# Patient Record
Sex: MALE | Race: WHITE
[De-identification: names, ages, dates, MRNs, and addresses within clinical notes are randomized per-mention and may not be internally consistent; named-entity substitution may affect disease eponyms.]

---

## 2018-05-05 ENCOUNTER — HOSPITAL ENCOUNTER (OUTPATIENT)
Dept: HOSPITAL 77 - KA.ED | Age: 53
Setting detail: OBSERVATION
LOS: 1 days | Discharge: HOME | End: 2018-05-06
Attending: NURSE PRACTITIONER | Admitting: PHYSICIAN ASSISTANT
Payer: COMMERCIAL

## 2018-05-05 DIAGNOSIS — F17.210: ICD-10-CM

## 2018-05-05 DIAGNOSIS — E83.42: ICD-10-CM

## 2018-05-05 DIAGNOSIS — E78.5: ICD-10-CM

## 2018-05-05 DIAGNOSIS — G47.00: ICD-10-CM

## 2018-05-05 DIAGNOSIS — L03.116: ICD-10-CM

## 2018-05-05 DIAGNOSIS — Z79.899: ICD-10-CM

## 2018-05-05 DIAGNOSIS — E86.0: ICD-10-CM

## 2018-05-05 DIAGNOSIS — E11.65: Primary | ICD-10-CM

## 2018-05-05 DIAGNOSIS — E43: ICD-10-CM

## 2018-05-05 DIAGNOSIS — D50.9: ICD-10-CM

## 2018-05-05 DIAGNOSIS — I10: ICD-10-CM

## 2018-05-05 DIAGNOSIS — K86.1: ICD-10-CM

## 2018-05-05 DIAGNOSIS — Z79.4: ICD-10-CM

## 2018-05-05 LAB
CHLORIDE SERPL-SCNC: 99 MMOL/L (ref 98–115)
SODIUM SERPL-SCNC: 133 MMOL/L (ref 136–145)

## 2018-05-05 PROCEDURE — 82962 GLUCOSE BLOOD TEST: CPT

## 2018-05-05 PROCEDURE — 96366 THER/PROPH/DIAG IV INF ADDON: CPT

## 2018-05-05 PROCEDURE — 87324 CLOSTRIDIUM AG IA: CPT

## 2018-05-05 PROCEDURE — 83036 HEMOGLOBIN GLYCOSYLATED A1C: CPT

## 2018-05-05 PROCEDURE — 85025 COMPLETE CBC W/AUTO DIFF WBC: CPT

## 2018-05-05 PROCEDURE — 80048 BASIC METABOLIC PNL TOTAL CA: CPT

## 2018-05-05 PROCEDURE — 83735 ASSAY OF MAGNESIUM: CPT

## 2018-05-05 PROCEDURE — 36415 COLL VENOUS BLD VENIPUNCTURE: CPT

## 2018-05-05 PROCEDURE — 99284 EMERGENCY DEPT VISIT MOD MDM: CPT

## 2018-05-05 PROCEDURE — 96375 TX/PRO/DX INJ NEW DRUG ADDON: CPT

## 2018-05-05 PROCEDURE — 80053 COMPREHEN METABOLIC PANEL: CPT

## 2018-05-05 PROCEDURE — G0378 HOSPITAL OBSERVATION PER HR: HCPCS

## 2018-05-05 PROCEDURE — 81001 URINALYSIS AUTO W/SCOPE: CPT

## 2018-05-05 PROCEDURE — 96365 THER/PROPH/DIAG IV INF INIT: CPT

## 2018-05-05 PROCEDURE — 96372 THER/PROPH/DIAG INJ SC/IM: CPT

## 2018-05-05 PROCEDURE — 83605 ASSAY OF LACTIC ACID: CPT

## 2018-05-05 PROCEDURE — 86140 C-REACTIVE PROTEIN: CPT

## 2018-05-05 PROCEDURE — 96361 HYDRATE IV INFUSION ADD-ON: CPT

## 2018-05-05 RX ADMIN — SODIUM CHLORIDE SCH GM: 0.9 INJECTION, SOLUTION INTRAVENOUS at 21:24

## 2018-05-05 RX ADMIN — SODIUM CHLORIDE SCH GM: 0.9 INJECTION, SOLUTION INTRAVENOUS at 17:11

## 2018-05-05 NOTE — PCM.HP
H&P History of Present Illness





- General


Date of Service: 05/05/18


Admit Problem/Dx: 


 Admission Diagnosis/Problem





Admission Diagnosis/Problem      Hyperglycemia








Source of Information: Patient, Old Records, Provider, RN


History Limitations: Reports: No Limitations


  ** Left Lower Leg


Pain Score (Numeric/FACES): 7





- Related Data


Allergies/Adverse Reactions: 


 Allergies











Allergy/AdvReac Type Severity Reaction Status Date / Time


 


No Known Drug Allergies Allergy  Cannot Verified 05/05/18 11:07





   Remember  











Home Medications: 


 Home Meds





Celecoxib 200 mg PO DAILY 05/05/18 [History]


Furosemide 20 mg PO DAILY 05/05/18 [History]


Lisinopril 20 mg PO DAILY 05/05/18 [History]


Vancomycin HCl 125 mg PO QID 05/05/18 [History]


oxyCODONE 10 mg PO ASDIRECTED 05/05/18 [History]


traZODone HCl [Trazodone HCl] 50 mg PO QAM 05/05/18 [History]











Past Medical History


HEENT History: Reports: Impaired Vision


Cardiovascular History: Reports: Hypertension


Gastrointestinal History: Reports: Chronic Diarrhea


Endocrine/Metabolic History: Reports: Diabetes, Type I





- Infectious Disease History


Infectious Disease History: Reports: C-Difficile, Chicken Pox





- Past Surgical History


HEENT Surgical History: Reports: Tonsillectomy


Cardiovascular Surgical History: Reports: None


GI Surgical History: Reports: Colonoscopy


Endocrine Surgical History: Reports: None


Musculoskeletal Surgical History: Reports: Arthroscopic Knee





Social & Family History





- Family History


Cardiac: Reports: Hypertension (Father)


Neurological: Reports: CVA (Maternal Grandmother)


Endocrine/Metabolic: Reports: Diabetes, type II (Brother)


Oncologic: Reports: Lung, Other (See Below)


Other Oncologic Family History: mother





- Tobacco Use


Smoking Status *Q: Current Every Day Smoker


Years of Tobacco use: 40


Packs/Tins Daily: 0.5


Used Tobacco, but Quit: No


Second Hand Smoke Exposure: Yes





- Caffeine Use


Caffeine Use: Reports: Soda


Other Caffeine Use: diet





- Recreational Drug Use


Recreational Drug Use: No





- Living Situation & Occupation


Living situation: Reports: , Other (Lives with son)


Occupation: Employed





H&P Review of Systems





- Review of Systems:


Review Of Systems: See Below


General: Reports: Chills (always cold).  Denies: Fever


HEENT: Reports: Glasses.  Denies: Ear Pain, Headaches, Sinus Congestion, Sore 

Throat


Pulmonary: Reports: Cough (occasional).  Denies: Shortness of Breath


Cardiovascular: Denies: Chest Pain, Edema, Lightheadedness


Gastrointestinal: Reports: Diarrhea (15 liquid stools with no stool consistency 

in last 12 hours).  Denies: Abdominal Pain, Constipation, Decreased Appetite, 

Nausea, Vomiting


Genitourinary: Reports: No Symptoms


Musculoskeletal: Reports: Leg Pain (Left lower leg pain)


Skin: Reports: Erythema (Improved).  Denies: Wound


Psychiatric: Denies: Agitation, Cravings


Neurological: Denies: Dizziness, Headache





Exam





- Exam


Exam: See Below





- Vital Signs


Vital Signs: 


 Last Vital Signs











Temp  98.6 F   05/05/18 12:30


 


Pulse  57 L  05/05/18 12:30


 


Resp  18   05/05/18 12:30


 


BP  165/94 H  05/05/18 12:30


 


Pulse Ox  100   05/05/18 12:30











Weight: 107 lb 3.2 oz





- Exam


Quality Assessment: DVT Prophylaxis (Score of 4-lovenox).  No: Supplemental 

Oxygen, Urinary Catheter


General: Alert, Oriented, Cooperative, Other (No distress)


HEENT: Conjunctiva Clear, Hearing Intact, Mucosa Moist & Pink, Posterior 

Pharynx Clear, TMs Clear, Glasses


Neck: Supple, Trachea Midline


Lungs: Clear to Auscultation, Normal Respiratory Effort


Cardiovascular: Regular Rate, Regular Rhythm, Normal S1, Normal S2


GI/Abdominal Exam: Soft, Non-Tender, No Distention, No Mass, Abnormal Bowel 

Sounds (hypoactive)


Extremities: No Pedal Edema, Leg Pain (Left lower leg pain with palpation of 

calf and shin, active ROM), Redness (left lower extremity sofía red in color, 

no open wounds or drainage).  No: Increased Warmth


Skin: Warm, Dry, Intact


Neurological: Normal Speech


Neuro Extensive - Mental Status: Alert, Oriented x3, Normal Mood/Affect


Psychiatric: Alert, Normal Affect, Normal Mood.  No: Anxious, Agitated





- Patient Data


Lab Results Last 24 hrs: 


 Laboratory Results - last 24 hr











  05/05/18 05/05/18 05/05/18 Range/Units





  11:35 11:35 11:35 


 


WBC  3.6 L    (5.0-10.0)  10^3/uL


 


RBC  3.53 L    (4.50-6.00)  10^6/uL


 


Hgb  11.7 L    (13.0-17.0)  g/dL


 


Hct  35.9 L    (40.0-52.0)  %


 


MCV  101.5 H    (82.0-92.0)  fL


 


MCH  33.0 H    (27.0-31.0)  pg


 


MCHC  32.5    (32.0-36.0)  g/dL


 


RDW  14.4    (11.5-14.5)  %


 


Plt Count  464 H    (150-300)  10^3/uL


 


MPV  6.5 L    (7.4-10.4)  fL


 


Neut % (Auto)  39.6 L    (50.0-70.0)  %


 


Lymph % (Auto)  42.6 H    (20.0-40.0)  %


 


Mono % (Auto)  12.9 H    (2.0-8.0)  %


 


Eos % (Auto)  3.6 H    (1.0-3.0)  %


 


Baso % (Auto)  1.3 H    (0.0-1.0)  %


 


Neut # (Auto)  1.4 L    (2.5-7.0)  10^3/uL


 


Lymph # (Auto)  1.6    (1.0-4.0)  10^3/uL


 


Mono # (Auto)  0.5    (0.1-0.8)  10^3/uL


 


Eos # (Auto)  0.1    (0.1-0.3)  10^3/uL


 


Baso # (Auto)  0.0    (0.0-0.1)  10^3/uL


 


Sodium   133 L   (136-145)  mmol/L


 


Potassium   5.2   (3.3-5.3)  mmol/L


 


Chloride   99   ()  mmol/L


 


Carbon Dioxide   19.2 L   (21.0-32.0)  mmol/L


 


BUN   12   (6-25)  mg/dL


 


Creatinine   0.53   (0.51-1.17)  mg/dL


 


Est Cr Clr Drug Dosing   102.22   mL/min


 


Estimated GFR (MDRD)   > 60   mL/min


 


Glucose   439 H   ()  mg/dL


 


POC Glucose     ()  mg/dl


 


Calcium   8.4 L   (8.7-10.3)  mg/dL


 


Magnesium    1.5 L  (1.8-2.4)  mg/dL


 


Total Bilirubin   0.3   (0.2-1.0)  mg/dL


 


AST   119 H   (15-37)  U/L


 


ALT   82 H   (12-78)  U/L


 


Alkaline Phosphatase   115   ()  IU/L


 


C-Reactive Protein     (0.0-0.9)  mg/dL


 


Total Protein   7.9   (6.4-8.2)  g/dL


 


Albumin   3.12   (3.00-4.80)  g/dL














  05/05/18 05/05/18 Range/Units





  11:35 13:15 


 


WBC    (5.0-10.0)  10^3/uL


 


RBC    (4.50-6.00)  10^6/uL


 


Hgb    (13.0-17.0)  g/dL


 


Hct    (40.0-52.0)  %


 


MCV    (82.0-92.0)  fL


 


MCH    (27.0-31.0)  pg


 


MCHC    (32.0-36.0)  g/dL


 


RDW    (11.5-14.5)  %


 


Plt Count    (150-300)  10^3/uL


 


MPV    (7.4-10.4)  fL


 


Neut % (Auto)    (50.0-70.0)  %


 


Lymph % (Auto)    (20.0-40.0)  %


 


Mono % (Auto)    (2.0-8.0)  %


 


Eos % (Auto)    (1.0-3.0)  %


 


Baso % (Auto)    (0.0-1.0)  %


 


Neut # (Auto)    (2.5-7.0)  10^3/uL


 


Lymph # (Auto)    (1.0-4.0)  10^3/uL


 


Mono # (Auto)    (0.1-0.8)  10^3/uL


 


Eos # (Auto)    (0.1-0.3)  10^3/uL


 


Baso # (Auto)    (0.0-0.1)  10^3/uL


 


Sodium    (136-145)  mmol/L


 


Potassium    (3.3-5.3)  mmol/L


 


Chloride    ()  mmol/L


 


Carbon Dioxide    (21.0-32.0)  mmol/L


 


BUN    (6-25)  mg/dL


 


Creatinine    (0.51-1.17)  mg/dL


 


Est Cr Clr Drug Dosing    mL/min


 


Estimated GFR (MDRD)    mL/min


 


Glucose    ()  mg/dL


 


POC Glucose   373 H  ()  mg/dl


 


Calcium    (8.7-10.3)  mg/dL


 


Magnesium    (1.8-2.4)  mg/dL


 


Total Bilirubin    (0.2-1.0)  mg/dL


 


AST    (15-37)  U/L


 


ALT    (12-78)  U/L


 


Alkaline Phosphatase    ()  IU/L


 


C-Reactive Protein  < 0.2   (0.0-0.9)  mg/dL


 


Total Protein    (6.4-8.2)  g/dL


 


Albumin    (3.00-4.80)  g/dL











Result Diagrams: 


 05/05/18 11:35





 05/05/18 11:35


Problem List Initiated/Reviewed/Updated: Yes


Orders Last 24hrs: 


 Active Orders 24 hr











 Category Date Time Status


 


 Patient Status [ADT] Routine ADT  05/05/18 12:24 Ordered


 


 Accu Check [Blood Glucose Check, Bedside] [RC] Care  05/05/18 13:47 Active





 QIDACANDBED   


 


 Intake and Output Strict [RC] ASDIRECTED Care  05/05/18 13:45 Active


 


 Oxygen Therapy [RC] PRN Care  05/05/18 12:24 Active


 


 Up ad Rema [RC] ASDIRECTED Care  05/05/18 13:45 Active


 


 VTE/DVT Education [RC] PER UNIT ROUTINE Care  05/05/18 12:24 Active


 


 Vital Signs [RC] 0300,0700,1100,1500,1900,2300 Care  05/05/18 12:24 Active


 


 CDIFF TOX A+B [OP] Routine Lab  05/05/18 13:14 Ordered


 


 LACTIC ACID [CHEM] Routine Lab  05/05/18 13:40 Received


 


 URINALYSIS W/MICROSCOPIC [UA W/MICROSCOPIC] [URIN] Stat Lab  05/05/18 12:26 

Ordered


 


 Acetaminophen [Tylenol Extra Strength] Med  05/05/18 13:44 Active





 1,000 mg PO Q6H PRN   


 


 B.Bif/B.Long/L.Acidoph/L.Rhamn [Multi-Chinyere Plus] Med  05/05/18 14:00 Ordered





 1 cap PO DAILY   


 


 Celecoxib [CeleBREX] Med  05/06/18 09:00 Active





 200 mg PO DAILY   


 


 Ibuprofen [Motrin] Med  05/05/18 13:56 Ordered





 800 mg PO Q8H PRN   


 


 Insulin Aspart [NovoLOG] Med  05/05/18 18:00 Active





 See Protocol  SUBCUT WITHMEALSANDBED   


 


 Insulin Detemir [Levemir] Med  05/06/18 09:00 Ordered





 32 unit SUBCUT DAILY   


 


 Lisinopril [Prinivil] Med  05/06/18 09:00 Active





 20 mg PO DAILY   


 


 Magnesium Oxide Med  05/05/18 14:00 Ordered





 500 mg PO BID   


 


 Piperacillin/Tazobactam [Zosyn] 4.5 gm Med  05/05/18 14:00 Ordered





 Sodium Chloride 0.9% [Normal Saline] 100 ml   





 IV Q8H   


 


 Sodium Chloride 0.9% @ 100 MLS/HR(1,000ml) Med  05/05/18 14:15 Ordered





 Sodium Chloride 0.9% [Normal Saline] 1,000 ml   





 IV ASDIRECTED   


 


 Vancomycin HCl [Vancomycin HCl] Med  05/05/18 17:00 Ordered





 125 mg PO QID   


 


 oxyCODONE [oxyCODONE] Med  05/05/18 13:55 Ordered





 10 mg PO QID PRN   


 


 traZODone Med  05/05/18 21:00 Ordered





 50 mg PO BEDTIME   


 


 Code Status [Resuscitation Status] Routine Resus Stat  05/05/18 13:46 Ordered








 Medication Orders





Acetaminophen (Tylenol Extra Strength)  1,000 mg PO Q6H PRN


   PRN Reason: Pain


Celecoxib (Celebrex)  200 mg PO DAILY ALINA


Piperacillin Sod/Tazobactam (Sod 4.5 gm/ Sodium Chloride)  100 mls @ 200 mls/hr 

IV Q8H ALINA


Ibuprofen (Motrin)  800 mg PO Q8H PRN


   PRN Reason: Pain


Insulin Aspart (Novolog)  0 unit SUBCUT WITHMEALSANDBED [facility] hospital; Protocol


Insulin Detemir (Levemir)  32 unit SUBCUT DAILY [facility] hospital


Lactobacillus Acidophilus/Rhamnosus (Multi-Chinyere Plus)  1 cap PO DAILY Critical [facility] hospital


Lisinopril (Prinivil)  20 mg PO DAILY ALINA


Magnesium Oxide (Magnesium Oxide)  500 mg PO BID Critical access hospital


Non-Formulary Medication (Vancomycin Hcl [Vancomycin Hcl])  125 mg PO QID [facility] hospital


Non-Formulary Medication (Oxycodone [Oxycodone])  10 mg PO QID PRN


   PRN Reason: severe pain


Trazodone HCl (Trazodone)  50 mg PO BEDTIME Critical [facility] hospital








Assessment/Plan Comment:: 





HPI: This is a 52 year old male who presented to the ED with concerns of 

diarrhea and left lower leg pain. Upon review of the [facility] chart, patient was 

hospitalized from 4/5/18-4/17/18 and 4/27/18-4/30/18 for left lower leg 

cellulitis with the first admission noting sepsis. Patient had MRI of the left 

lower extremity done on 4/5/18 that showed a combination of fasciitis, 

cellulitis, and myositis, but no evidence of osteomyelitis. On 4/28/18 patient 

had ultrasound of the extremity which showed persistent intrafascial collection 

similar to previous examinations. Patient notes the swelling and redness has 

improved, however the pain has not. He has taken tylenol at home without 

relief. The patient has a PICC line in place and is having Zosyn 4.5 gm every 8 

hours IV for a total of 28 days for treatment. In regards to the diarrhea, 

patient was noted to have a positive Clostridium difficile infection on 4/30/ 18. He has been taking oral vancomyin QID at home. 





Pertinent ED workup: 


-Labs: WBC 3.6, Sodium 133, Potassium 5.2, Creatinine 0.53, Glucose 439, 

, ALT 82; UA order-uncollected at this time


-IV dilaudid 0.5 mg x 1 - patient reports no relief, pain 10/10


-2 NS 1 liter boluses given


________________________________________________________________________________

______________________________________________________





Further workup: 


-CRP <0.2, Mg 1.5, Lactic acid 3.2


-Repeat Cdiff sample ordered





Primary Assessment/Plan: 





Hyperglycemia in type 2 versus type 1 diabetic. Epic chart notes type 1, 

however patient states he is a type 2. Accuchecks QID. Glucose down to 373. 

Continue lantus 32 units daily. Add novolog low dose sliding scale. ADA high 

protein diet. Obtain urine sample. 





Clostridium difficile infection. Repeat Cdiff when able. Continue vancomycin, 

however this will have to be changed to IV as no oral in stock and patient did 

not bring his with. NS at 100 mL/hr. Repeat BMP in AM. Probiotic daily. 





Left lower leg cellulitis, improving. Lactic acid 3.6--up slightly from 4/28/18 

at 2.0, CRP <0.2. Pictures reviewed in Epic chart from last hospitalization and 

there is great improvement noted today. Continue with zosyn 4.5 gm IV every 8 

hours per ID recommendation. Patient noting significant pain and no relief from 

dilaudid in ER. Review of Epic note, notes that he was given tylenol and 

ibuprofen while hospitalized and providers should be careful in prescribing 

opioids. 





Drug seeking behavior. Nurse called myself into patient room as he was refusing 

tylenol, ibuprofen, and oxycodone as ordered. Reviewed with patient that he 

must try these medications for pain as he is not able to be discharged with an 

IV pain medication. Patient resistant, but eventually agreeable to trying 

oxycodone 10 mg po. [place] PDMP reviewed and shows patient did last receive 

oxycodone 10 mg per his PCP on 4/24/18, however he states he has never taken 

any.





Hypomagnesemia. Mg 1.5. Magnesium oxide 500 mg po BID. Repeat level in AM. 





Secondary Assessment/Plan: 





Hypertension. Continue lisinopril. 





Hyperlipidemia. Not on statin. 





Tobacco abuse. Patient smokes 1/2 ppd. Declines nicotine patch at this time. 





Chronic pancreatitis. 





History of alcohol-induced pancreatitis. Reports minimal alcohol intake. Will 

observe for signs of DTs. 





Elevated LFTs.  and ALT 82, however these values are improved from Jan. 2018. 





Severe protein-calorie malnutrition. High protein diet ordered. 





Insomnia. Continue trazodone, however take at bedtime instead of the AM. 





Iron deficiency anemia. Hgb 11.7. Previous workup shows iron 36, TIBC 174, and 

ferritin 508. Patient not on supplementation. Will continue to monitor as this 

is improved from discharge hemoglobin of 9.3 on 4/30/18. 





DVT prophylaxis. Score of 4. Lovenox 30 mg subQ daily given weight. 





Overall treatment plan: Re-hydrate patient with IV fluids and continue to 

monitor blood sugars. Patient may be able to be discharged this evening or most 

definitely in the AM.

## 2018-05-05 NOTE — EDM.PDOC
ED HPI GENERAL MEDICAL PROBLEM





- General


Chief Complaint: Lower Extremity Injury/Pain


Stated Complaint: LEFT LEG SWELLING


Time Seen by Provider: 05/05/18 11:22


Source of Information: Reports: Patient


History Limitations: Reports: No Limitations





- History of Present Illness


INITIAL COMMENTS - FREE TEXT/NARRATIVE: 





Patient is a 52-year-old gentleman who presents to the emergency department 

this morning with a complaint of left lower extremity pain and chronic 

diarrhea.  Patient states that he initially was diagnosed with cellulitis of 

left lower extremity 1 month ago and was started on antibiotics.  Patient was 

found to have C. difficile on April 30.  He was then started on vancomycin.  

Patient states that he chronic discomfort of left lower extremity and diarrhea 

is persistent, and he feels extremely weak.  He is a [facility] Patient.  

Patient denies fever, chest pain, shortness of breath,, stiff neck, out of 

country travel, or any change in medication other than recent vancomycin 

initiation.


Onset: Gradual


Duration: Chronic


Location: Reports: Abdomen, Lower Extremity, Left


Quality: Reports: Ache, Burning


Severity: Mild


Improves with: Reports: None


Worsens with: Reports: Movement


Associated Symptoms: Reports: No Other Symptoms


Treatments PTA: Reports: Other (see below) (Patient is on vancomycin daily.  

Patient is also has PICC line for another unknown antibiotic.)


  ** Left Lower Leg


Pain Score (Numeric/FACES): 10





- Related Data


 Allergies











Allergy/AdvReac Type Severity Reaction Status Date / Time


 


No Known Drug Allergies Allergy  Cannot Verified 05/05/18 11:07





   Remember  











Home Meds: 


 Home Meds





Celecoxib 200 mg PO DAILY 05/05/18 [History]


Furosemide 20 mg PO DAILY 05/05/18 [History]


Lisinopril 20 mg PO DAILY 05/05/18 [History]


Vancomycin HCl 125 mg PO QID 05/05/18 [History]


oxyCODONE 10 mg PO ASDIRECTED 05/05/18 [History]


traZODone HCl [Trazodone HCl] 50 mg PO QAM 05/05/18 [History]











Review of Systems





- Review of Systems


Review Of Systems: ROS reveals no pertinent complaints other than HPI.


Constitutional: Reports: Weakness


Eyes: Reports: No Symptoms


Ears: Reports: No Symptoms


Nose: Reports: No Symptoms


Mouth/Throat: Reports: No Symptoms


Respiratory: Reports: No Symptoms


Cardiovascular: Reports: No Symptoms


GI/Abdominal: Reports: Diarrhea.  Denies: Nausea, Vomiting


Genitourinary: Reports: No Symptoms


Musculoskeletal: Reports: Leg Pain (Left lower)


Skin: Reports: Erythema (Persistent secondary to cellulitis of left lower 

extremity from knee distal to ankle.)


Neurological: Reports: No Symptoms


Psychiatric: Reports: No Symptoms





ED EXAM, GENERAL





- Physical Exam


Exam: See Below


Exam Limited By: No Limitations


General Appearance: Alert, WD/WN, No Apparent Distress


Nose: Normal Inspection, Normal Mucosa, No Blood


Throat/Mouth: Normal Inspection, Normal Oropharynx, No Airway Compromise


Head: Atraumatic, Normocephalic


Neck: Normal Inspection


Respiratory/Chest: No Respiratory Distress, Lungs Clear, Normal Breath Sounds, 

No Accessory Muscle Use, Chest Non-Tender


Cardiovascular: Regular Rate, Rhythm


GI/Abdominal: Normal Bowel Sounds, Soft, Tender (Mildly tender left lower 

quadrant.  Patient states that this is not a new discomfort.)


Back Exam: Normal Inspection.  No: CVA Tenderness (L), CVA Tenderness (R)


Extremities: Leg Pain, Other (Left lower extremity from just below knee distal 

to ankle and medial aspect color changes with resolving cellulitis.)


Neurological: Alert, Oriented, Normal Cognition


Psychiatric: Normal Affect, Normal Mood


Skin Exam: Warm, Dry, Intact.  No: Ecchymosis, Increased Warmth, Zoster-Like 

Rash


Lymphatic: No Adenopathy





Course





- Vital Signs


Last Recorded V/S: 





 Last Vital Signs











Temp  98.6 F   05/05/18 11:04


 


Pulse  66   05/05/18 11:04


 


Resp  18   05/05/18 11:04


 


BP  160/89 H  05/05/18 11:04


 


Pulse Ox  100   05/05/18 11:04














- Orders/Labs/Meds


Orders: 





 Active Orders 24 hr











 Category Date Time Status


 


 CBC WITH AUTO DIFF [HEME] Stat Lab  05/05/18 11:22 Ordered


 


 COMPREHENSIVE METABOLIC PN,CMP [CHEM] Stat Lab  05/05/18 11:22 Ordered


 


 HYDROmorphone [Dilaudid] Med  05/05/18 11:23 Once





 0.5 mg IVPUSH ONETIME ONE   


 


 Sodium Chloride 0.9% [Normal Saline] 1,000 ml Med  05/05/18 11:22 Ordered





 IV .BOLUS   








 Medication Orders





Hydromorphone HCl (Dilaudid)  0.5 mg IVPUSH ONETIME ONE


   Stop: 05/05/18 11:24


Sodium Chloride (Normal Saline)  1,000 mls @ 1,000 mls/hr IV .BOLUS STA


   Stop: 05/05/18 12:21








Meds: 





Medications











Generic Name Dose Route Start Last Admin





  Trade Name Jemma  PRN Reason Stop Dose Admin


 


Hydromorphone HCl  0.5 mg  05/05/18 11:23  





  Dilaudid  IVPUSH  05/05/18 11:24  





  ONETIME ONE   





     





     





     





     


 


Sodium Chloride  1,000 mls @ 1,000 mls/hr  05/05/18 11:22  





  Normal Saline  IV  05/05/18 12:21  





  .BOLUS STA   





     





     





     





     














- Re-Assessments/Exams


Free Text/Narrative Re-Assessment/Exam: 





05/05/18 12:27


Patient afebrile, nontoxic appearing, vital signs stable.  Pain controlled.  

Discussed case with Monica Jesus, nurse practitioner, and patient will be 

admitted for observation to [facility] and followed





Departure





- Departure


Time of Disposition: 12:28


Disposition: Refer to Observation


Condition: Fair


Clinical Impression: 


 Hyperglycemia, Dehydration, C. difficile diarrhea








- Discharge Information


Referrals: 


Geronimo Muñoz PA-C [Primary Care Provider] - 





- My Orders


Last 24 Hours: 





My Active Orders





05/05/18 11:22


CBC WITH AUTO DIFF [HEME] Stat 


COMPREHENSIVE METABOLIC PN,CMP [CHEM] Stat 


Sodium Chloride 0.9% [Normal Saline] 1,000 ml IV .BOLUS 





05/05/18 11:23


HYDROmorphone [Dilaudid]   0.5 mg IVPUSH ONETIME ONE 














- Assessment/Plan


Last 24 Hours: 





My Active Orders





05/05/18 11:22


CBC WITH AUTO DIFF [HEME] Stat 


COMPREHENSIVE METABOLIC PN,CMP [CHEM] Stat 


Sodium Chloride 0.9% [Normal Saline] 1,000 ml IV .BOLUS 





05/05/18 11:23


HYDROmorphone [Dilaudid]   0.5 mg IVPUSH ONETIME ONE 











Assessment:: 





Hyperglycemia, dehydration, C. difficile


Plan: 





Admit observation for [place]

## 2018-05-06 LAB
CHLORIDE SERPL-SCNC: 104 MMOL/L (ref 98–115)
SODIUM SERPL-SCNC: 135 MMOL/L (ref 136–145)

## 2018-05-06 RX ADMIN — SODIUM CHLORIDE SCH GM: 0.9 INJECTION, SOLUTION INTRAVENOUS at 08:14

## 2018-05-06 NOTE — [id number]
**Discharge Summary





- Hospital Course


Brief History: This is a 52 year old male who presented to the ED with concerns 

of diarrhea and left lower leg pain. Upon review of the [facility] chart, patient was 

hospitalized from 4/5/18-4/27/18 and 4/27/18-4/30/18 for left lower leg 

cellulitis with the first admission noting sepsis. Patient had MRI of left 

extremity done on 4/5/18 that showed a combination of fasciitis, cellulitis, 

and myositis without evidence of osteomyelitis. The patient has a PICC line in 

place and is receiving Zosyn 4.5 gm IV every 8 hours for a total of 28 days for 

treatment of this. The patient had tried tylenol at home without relief in 

pain. The patient was found to have a positive Cdiff infection on 4/30/18 and 

had been on oral vancomycin at home. He reported 15 loose stools in the 

previous 12 hours prior to admission. He was found to have hyperglycemia and 

clinical dehdyration in the ED and was subsequently admitted for IV fluids and 

glucose monitoring.





- Discharge Data


Discharge Date: 05/06/18


Discharge Disposition: Home, Self-Care 01


Condition: Good





- Patient Instructions


Diet: Diabetic Diet


Activity: As Tolerated


Driving: May Drive Today


Showering/Bathing: May Shower


Notify Provider of: Fever, Increased Pain, Swelling and Redness, Drainage


Other/Special Instructions: Continue to monitor your blood sugars four times a 

day and use the novolog sliding scale I have given you to correct high 

readings.  Finish 7 days total of Vancomycin as your repeat stool sample was 

negative.  Tylenol 1000 mg every 6 hours as needed for pain AND/OR Ibuprofen 

800 mg three times a day as needed for pain.  Continue to take a probiotic once 

a day as before.





- Discharge Plan


Prescriptions/Med Rec: 


Magnesium Oxide 500 mg PO TID #90 tablet


traZODone HCl [Trazodone HCl] 100 mg PO BEDTIME #60 tablet


Home Medications: 


 Home Meds





Celecoxib 200 mg PO DAILY 05/05/18 [History]


Furosemide 20 mg PO DAILY 05/05/18 [History]


Lisinopril 20 mg PO DAILY 05/05/18 [History]


Acetaminophen [Tylenol Extra Strength] 1,000 mg PO Q6H PRN  tablet 05/06/18 [Rx]


Insulin Aspart [NovoLOG] See Protocol SUBCUT WITHMEALSANDBED  pen 05/06/18 [Rx]


Insulin Detemir [Levemir] 32 unit SUBCUT DAILY  pen 05/06/18 [Rx]


Magnesium Oxide 500 mg PO TID #90 tablet 05/06/18 [Rx]


Piperacillin/Tazobactam [Zosyn] 4.5 gm IV Q8H  vial 05/06/18 [Rx]


Vancomycin HCl 125 mg PO QID #0 05/06/18 [Rx]


traZODone HCl [Trazodone HCl] 100 mg PO BEDTIME #60 tablet 05/06/18 [Rx]








Referrals: 


Geronimo Muñoz PA-C [Primary Care Provider] - 05/09/18





- Discharge Summary/Plan Comment


DC Time >30 min.: No


Discharge Summary/Plan Comment: 





Date of admission 5/5/18





Date of discharge: 5/6/18





Admitting diagnosis: 





   Primary: Hyperglycemia in type 2 diabetic on insulin, Cdiff infection with 

clinical dehydration, Left lower leg cellulitis with pain, Hypomagnesemia, Drug-

seeking behavior


   Secondary: Hypertension, Hyperlipidemia, Tobacco abuse, Chronic pancreatitis

, History of alcohol-induced pancreatitis, Elevated LFTs, Severe protein-

calorie malnutrition, Insomnia, Iron deficiency anemia





Final diagnosis: 





   Primary; Hyperglycemia in type 2 diabetic on insulin, improving; Cdiff 

infection, resolved; Clinical dehydration, resolved; Left lower leg cellulitis, 

resolving; Left lower leg pain, unchanged; Hypomagnesemia, stable; Drug-seeking 

behavior


   Secondary; Hypertension, Hyperlipidemia, Tobacco abuse, Chronic pancreatitis

, History of alcohol-induced pancreatitis, Elevated LFTs, Severe protein-

calorie malnutrition, Insomnia, Iron deficiency anemia





Procedures performed: None





Hospital Course:





The patient had an uneventful hospital course that went as expected. The 

patient remained afebrile and hemodynamically stable throughout his stay. He 

was given 2 one liter boluses of normal saline and then continued on NS at 100 

mL/hr. He was able to drink and eat without incident. The patient's blood 

sugars were monitored QID and corrected with insulin. Glucoses after admission 

include 373, 433, 485, and 275. A1c 9.7. He was given his home lantus dose as 

well. UA negative for ketones. The patient was found to have hypomagnesemia and 

was given oral replacement of this. The patient was continued on vancomycin, 

however this had to be changed to IV as pills were not available. He had only 1 

soft stool during his stay, which was evaluated for Cdiff infection and this 

was found to be negative. In regards to his left lower leg cellulitis and pain, 

the patient was continued on IV Zosyn at 4.5 gm every 8 hours per ID. Patient 

had been given dilaudid 0.5 mg IV x 1 in the ER and reported no relief in 

symptoms. He was continued on celebrex 200 mg daily. Patient was offered 

tylenol which he refused. Patient was offered ibuprofen and had no relief. He 

was offered oxycodone which he used 2 tablets and reported no relief. He 

frequently was asking for high dose pain medications. Discussion held with 

patient that cellulitis infection was improving so pain should be as well. 

Discussed with patient that evidence-based medicine does not suggest narcotic 

pain medications for cellulitis. On the day of discharge, patient was 

questioned further about leg pain and he denies symptoms of claudication, 

however given his smoking history it may be worthwhile for him to have exercise 

ABIs performed for further evaluation. The patient also requesting medication 

for sleep. Patient's PCP had prescribed ambien recently, however patient states 

he doesn't believe he tried this. I have suggested trying an increase in 

trazodone to 100 mg at bedtime and patient agreeable to this. 





New medications on discharge: None





New changes to home medications on discharge: 


-Continue vancomycin 125 mg po QID for a total of 1 week instead of 14 days


-Discontinue oxycodone-never started this at home


-Increase trazodone to 100 mg po at bedtime


-Increase magnesium oxide to 500 mg po TID


-Change home novolog subQ to follow medium dose sliding scale as provided 





Regular home medications on discharge: 


-Celebrex 200 mg po daily


-Lisinopril 20 mg po daily


-Lasix 20 mg po daily


-Zosyn 4.8 mg IV every 8 hours for total of 28 days per ID


-Lantus 32 units subQ daily


-Tylenol 1000 mg po every 6 hours PRN


-Ibuprofen 800 mg po every 8 hours PRN


-Probiotic 1 capsule po daily





Condition, Treatment, & Final Disposition: The patient is in stable condition 

at the time of discharge. He will be discharged home with his friend. He is to 

see ELISE Boss in the [facility] on Tuesday or Wednesday this 

week. Considerations at follow-up would include repeat magnesium level to 

ensure stability, monitoring stool output as repeat Cdiff was negative, 

ensuring sugars are normalizing with novolog medium dose sliding scale. May 

also consider referral to vascular for further evaluation of leg pain. 








- General Info


Date of Service: 05/06/18


Functional Status: Reports: Tolerating Diet, Ambulating, Urinating.  Denies: 

Pain Controlled





- Review of Systems


General: Denies: Fever, Weakness, Chills


HEENT: Denies: Headaches


Pulmonary: Denies: Shortness of Breath


Cardiovascular: Denies: Chest Pain, Edema


Gastrointestinal: Denies: Abdominal Pain, Decreased Appetite (increased 

appetite and thirst), Diarrhea, Nausea, Vomiting


Genitourinary: Reports: No Symptoms


Musculoskeletal: Reports: Leg Pain (left lower leg pain)


Skin: Reports: Dryness


Neurological: Denies: Headache


Psychiatric: Reports: No Symptoms





- Patient Data


Vitals - Most Recent: 


 Last Vital Signs











Temp  97.9 F   05/06/18 06:24


 


Pulse  54 L  05/06/18 06:24


 


Resp  18   05/06/18 06:24


 


BP  129/87   05/06/18 08:15


 


Pulse Ox  99   05/06/18 06:24











Weight - Most Recent: 107 lb 3.2 oz


I&O - Last 24 hours: 


 Intake & Output











 05/05/18 05/06/18 05/06/18





 22:59 06:59 14:59


 


Intake Total 950 2504 


 


Output Total 450 700 


 


Balance 500 1804 











Lab Results - Last 24 hrs: 


 Laboratory Results - last 24 hr











  05/05/18 05/05/18 05/05/18 Range/Units





  11:35 11:35 11:35 


 


WBC  3.6 L    (5.0-10.0)  10^3/uL


 


RBC  3.53 L    (4.50-6.00)  10^6/uL


 


Hgb  11.7 L    (13.0-17.0)  g/dL


 


Hct  35.9 L    (40.0-52.0)  %


 


MCV  101.5 H    (82.0-92.0)  fL


 


MCH  33.0 H    (27.0-31.0)  pg


 


MCHC  32.5    (32.0-36.0)  g/dL


 


RDW  14.4    (11.5-14.5)  %


 


Plt Count  464 H    (150-300)  10^3/uL


 


MPV  6.5 L    (7.4-10.4)  fL


 


Neut % (Auto)  39.6 L    (50.0-70.0)  %


 


Lymph % (Auto)  42.6 H    (20.0-40.0)  %


 


Mono % (Auto)  12.9 H    (2.0-8.0)  %


 


Eos % (Auto)  3.6 H    (1.0-3.0)  %


 


Baso % (Auto)  1.3 H    (0.0-1.0)  %


 


Neut # (Auto)  1.4 L    (2.5-7.0)  10^3/uL


 


Lymph # (Auto)  1.6    (1.0-4.0)  10^3/uL


 


Mono # (Auto)  0.5    (0.1-0.8)  10^3/uL


 


Eos # (Auto)  0.1    (0.1-0.3)  10^3/uL


 


Baso # (Auto)  0.0    (0.0-0.1)  10^3/uL


 


Sodium   133 L   (136-145)  mmol/L


 


Potassium   5.2   (3.3-5.3)  mmol/L


 


Chloride   99   ()  mmol/L


 


Carbon Dioxide   19.2 L   (21.0-32.0)  mmol/L


 


BUN   12   (6-25)  mg/dL


 


Creatinine   0.53   (0.51-1.17)  mg/dL


 


Est Cr Clr Drug Dosing   102.22   mL/min


 


Estimated GFR (MDRD)   > 60   mL/min


 


Glucose   439 H   ()  mg/dL


 


POC Glucose     ()  mg/dl


 


Hemoglobin A1c     (4.3-5.7)  %


 


Lactic Acid     (0.4-2.0)  mmol/L


 


Calcium   8.4 L   (8.7-10.3)  mg/dL


 


Magnesium    1.5 L  (1.8-2.4)  mg/dL


 


Total Bilirubin   0.3   (0.2-1.0)  mg/dL


 


AST   119 H   (15-37)  U/L


 


ALT   82 H   (12-78)  U/L


 


Alkaline Phosphatase   115   ()  IU/L


 


C-Reactive Protein     (0.0-0.9)  mg/dL


 


Total Protein   7.9   (6.4-8.2)  g/dL


 


Albumin   3.12   (3.00-4.80)  g/dL


 


Specimen Type     


 


Urine Color     (YELLOW)  


 


Urine Appearance     (CLEAR)  


 


Urine pH     (5.0-9.0)  


 


Ur Specific Gravity     (1.005-1.030)  


 


Urine Protein     (NEGATIVE)  mg/dL


 


Urine Glucose (UA)     (NEGATIVE)  mg/dL


 


Urine Ketones     (NEGATIVE)  mg/dL


 


Urine Occult Blood     (NEGATIVE)  


 


Urine Nitrite     (NEGATIVE)  


 


Urine Bilirubin     (NEGATIVE)  


 


Urine Urobilinogen     (0.2-1.0)  E.U./dL


 


Ur Leukocyte Esterase     (NEGATIVE)  


 


Urine RBC     /HPF


 


Urine WBC     /HPF


 


Ur Epithelial Cells     /LPF


 


Urine Bacteria     (NONE TO FEW)  /HPF


 


Hyaline Casts     (NEGATIVE)  /LPF


 


Urine Mucus     (NEGATIVE)  /LPF














  05/05/18 05/05/18 05/05/18 Range/Units





  11:35 13:15 13:40 


 


WBC     (5.0-10.0)  10^3/uL


 


RBC     (4.50-6.00)  10^6/uL


 


Hgb     (13.0-17.0)  g/dL


 


Hct     (40.0-52.0)  %


 


MCV     (82.0-92.0)  fL


 


MCH     (27.0-31.0)  pg


 


MCHC     (32.0-36.0)  g/dL


 


RDW     (11.5-14.5)  %


 


Plt Count     (150-300)  10^3/uL


 


MPV     (7.4-10.4)  fL


 


Neut % (Auto)     (50.0-70.0)  %


 


Lymph % (Auto)     (20.0-40.0)  %


 


Mono % (Auto)     (2.0-8.0)  %


 


Eos % (Auto)     (1.0-3.0)  %


 


Baso % (Auto)     (0.0-1.0)  %


 


Neut # (Auto)     (2.5-7.0)  10^3/uL


 


Lymph # (Auto)     (1.0-4.0)  10^3/uL


 


Mono # (Auto)     (0.1-0.8)  10^3/uL


 


Eos # (Auto)     (0.1-0.3)  10^3/uL


 


Baso # (Auto)     (0.0-0.1)  10^3/uL


 


Sodium     (136-145)  mmol/L


 


Potassium     (3.3-5.3)  mmol/L


 


Chloride     ()  mmol/L


 


Carbon Dioxide     (21.0-32.0)  mmol/L


 


BUN     (6-25)  mg/dL


 


Creatinine     (0.51-1.17)  mg/dL


 


Est Cr Clr Drug Dosing     mL/min


 


Estimated GFR (MDRD)     mL/min


 


Glucose     ()  mg/dL


 


POC Glucose   373 H   ()  mg/dl


 


Hemoglobin A1c     (4.3-5.7)  %


 


Lactic Acid    3.2 H  (0.4-2.0)  mmol/L


 


Calcium     (8.7-10.3)  mg/dL


 


Magnesium     (1.8-2.4)  mg/dL


 


Total Bilirubin     (0.2-1.0)  mg/dL


 


AST     (15-37)  U/L


 


ALT     (12-78)  U/L


 


Alkaline Phosphatase     ()  IU/L


 


C-Reactive Protein  < 0.2    (0.0-0.9)  mg/dL


 


Total Protein     (6.4-8.2)  g/dL


 


Albumin     (3.00-4.80)  g/dL


 


Specimen Type     


 


Urine Color     (YELLOW)  


 


Urine Appearance     (CLEAR)  


 


Urine pH     (5.0-9.0)  


 


Ur Specific Gravity     (1.005-1.030)  


 


Urine Protein     (NEGATIVE)  mg/dL


 


Urine Glucose (UA)     (NEGATIVE)  mg/dL


 


Urine Ketones     (NEGATIVE)  mg/dL


 


Urine Occult Blood     (NEGATIVE)  


 


Urine Nitrite     (NEGATIVE)  


 


Urine Bilirubin     (NEGATIVE)  


 


Urine Urobilinogen     (0.2-1.0)  E.U./dL


 


Ur Leukocyte Esterase     (NEGATIVE)  


 


Urine RBC     /HPF


 


Urine WBC     /HPF


 


Ur Epithelial Cells     /LPF


 


Urine Bacteria     (NONE TO FEW)  /HPF


 


Hyaline Casts     (NEGATIVE)  /LPF


 


Urine Mucus     (NEGATIVE)  /LPF














  05/05/18 05/05/18 05/05/18 Range/Units





  17:27 17:30 21:23 


 


WBC     (5.0-10.0)  10^3/uL


 


RBC     (4.50-6.00)  10^6/uL


 


Hgb     (13.0-17.0)  g/dL


 


Hct     (40.0-52.0)  %


 


MCV     (82.0-92.0)  fL


 


MCH     (27.0-31.0)  pg


 


MCHC     (32.0-36.0)  g/dL


 


RDW     (11.5-14.5)  %


 


Plt Count     (150-300)  10^3/uL


 


MPV     (7.4-10.4)  fL


 


Neut % (Auto)     (50.0-70.0)  %


 


Lymph % (Auto)     (20.0-40.0)  %


 


Mono % (Auto)     (2.0-8.0)  %


 


Eos % (Auto)     (1.0-3.0)  %


 


Baso % (Auto)     (0.0-1.0)  %


 


Neut # (Auto)     (2.5-7.0)  10^3/uL


 


Lymph # (Auto)     (1.0-4.0)  10^3/uL


 


Mono # (Auto)     (0.1-0.8)  10^3/uL


 


Eos # (Auto)     (0.1-0.3)  10^3/uL


 


Baso # (Auto)     (0.0-0.1)  10^3/uL


 


Sodium     (136-145)  mmol/L


 


Potassium     (3.3-5.3)  mmol/L


 


Chloride     ()  mmol/L


 


Carbon Dioxide     (21.0-32.0)  mmol/L


 


BUN     (6-25)  mg/dL


 


Creatinine     (0.51-1.17)  mg/dL


 


Est Cr Clr Drug Dosing     mL/min


 


Estimated GFR (MDRD)     mL/min


 


Glucose     ()  mg/dL


 


POC Glucose  433 H   485 H  ()  mg/dl


 


Hemoglobin A1c     (4.3-5.7)  %


 


Lactic Acid     (0.4-2.0)  mmol/L


 


Calcium     (8.7-10.3)  mg/dL


 


Magnesium     (1.8-2.4)  mg/dL


 


Total Bilirubin     (0.2-1.0)  mg/dL


 


AST     (15-37)  U/L


 


ALT     (12-78)  U/L


 


Alkaline Phosphatase     ()  IU/L


 


C-Reactive Protein     (0.0-0.9)  mg/dL


 


Total Protein     (6.4-8.2)  g/dL


 


Albumin     (3.00-4.80)  g/dL


 


Specimen Type   Urincc   


 


Urine Color   Yellow   (YELLOW)  


 


Urine Appearance   Clear   (CLEAR)  


 


Urine pH   5.0   (5.0-9.0)  


 


Ur Specific Gravity   1.020   (1.005-1.030)  


 


Urine Protein   Negative   (NEGATIVE)  mg/dL


 


Urine Glucose (UA)   500 H   (NEGATIVE)  mg/dL


 


Urine Ketones   Negative   (NEGATIVE)  mg/dL


 


Urine Occult Blood   Negative   (NEGATIVE)  


 


Urine Nitrite   Negative   (NEGATIVE)  


 


Urine Bilirubin   Negative   (NEGATIVE)  


 


Urine Urobilinogen   0.2   (0.2-1.0)  E.U./dL


 


Ur Leukocyte Esterase   Negative   (NEGATIVE)  


 


Urine RBC   0-5   /HPF


 


Urine WBC   0-5   /HPF


 


Ur Epithelial Cells   Rare   /LPF


 


Urine Bacteria   Not seen   (NONE TO FEW)  /HPF


 


Hyaline Casts   Moderate H   (NEGATIVE)  /LPF


 


Urine Mucus   Moderate H   (NEGATIVE)  /LPF














  05/06/18 05/06/18 05/06/18 Range/Units





  04:25 07:19 07:33 


 


WBC     (5.0-10.0)  10^3/uL


 


RBC     (4.50-6.00)  10^6/uL


 


Hgb     (13.0-17.0)  g/dL


 


Hct     (40.0-52.0)  %


 


MCV     (82.0-92.0)  fL


 


MCH     (27.0-31.0)  pg


 


MCHC     (32.0-36.0)  g/dL


 


RDW     (11.5-14.5)  %


 


Plt Count     (150-300)  10^3/uL


 


MPV     (7.4-10.4)  fL


 


Neut % (Auto)     (50.0-70.0)  %


 


Lymph % (Auto)     (20.0-40.0)  %


 


Mono % (Auto)     (2.0-8.0)  %


 


Eos % (Auto)     (1.0-3.0)  %


 


Baso % (Auto)     (0.0-1.0)  %


 


Neut # (Auto)     (2.5-7.0)  10^3/uL


 


Lymph # (Auto)     (1.0-4.0)  10^3/uL


 


Mono # (Auto)     (0.1-0.8)  10^3/uL


 


Eos # (Auto)     (0.1-0.3)  10^3/uL


 


Baso # (Auto)     (0.0-0.1)  10^3/uL


 


Sodium    135 L  (136-145)  mmol/L


 


Potassium    5.1  (3.3-5.3)  mmol/L


 


Chloride    104  ()  mmol/L


 


Carbon Dioxide    20.9 L  (21.0-32.0)  mmol/L


 


BUN    15  (6-25)  mg/dL


 


Creatinine    0.54  (0.51-1.17)  mg/dL


 


Est Cr Clr Drug Dosing    107.56  mL/min


 


Estimated GFR (MDRD)    > 60  mL/min


 


Glucose    311 H  ()  mg/dL


 


POC Glucose  322 H  275 H   ()  mg/dl


 


Hemoglobin A1c     (4.3-5.7)  %


 


Lactic Acid     (0.4-2.0)  mmol/L


 


Calcium    8.2 L  (8.7-10.3)  mg/dL


 


Magnesium    1.4 L  (1.8-2.4)  mg/dL


 


Total Bilirubin     (0.2-1.0)  mg/dL


 


AST     (15-37)  U/L


 


ALT     (12-78)  U/L


 


Alkaline Phosphatase     ()  IU/L


 


C-Reactive Protein     (0.0-0.9)  mg/dL


 


Total Protein     (6.4-8.2)  g/dL


 


Albumin     (3.00-4.80)  g/dL


 


Specimen Type     


 


Urine Color     (YELLOW)  


 


Urine Appearance     (CLEAR)  


 


Urine pH     (5.0-9.0)  


 


Ur Specific Gravity     (1.005-1.030)  


 


Urine Protein     (NEGATIVE)  mg/dL


 


Urine Glucose (UA)     (NEGATIVE)  mg/dL


 


Urine Ketones     (NEGATIVE)  mg/dL


 


Urine Occult Blood     (NEGATIVE)  


 


Urine Nitrite     (NEGATIVE)  


 


Urine Bilirubin     (NEGATIVE)  


 


Urine Urobilinogen     (0.2-1.0)  E.U./dL


 


Ur Leukocyte Esterase     (NEGATIVE)  


 


Urine RBC     /HPF


 


Urine WBC     /HPF


 


Ur Epithelial Cells     /LPF


 


Urine Bacteria     (NONE TO FEW)  /HPF


 


Hyaline Casts     (NEGATIVE)  /LPF


 


Urine Mucus     (NEGATIVE)  /LPF














  05/06/18 Range/Units





  07:33 


 


WBC   (5.0-10.0)  10^3/uL


 


RBC   (4.50-6.00)  10^6/uL


 


Hgb   (13.0-17.0)  g/dL


 


Hct   (40.0-52.0)  %


 


MCV   (82.0-92.0)  fL


 


MCH   (27.0-31.0)  pg


 


MCHC   (32.0-36.0)  g/dL


 


RDW   (11.5-14.5)  %


 


Plt Count   (150-300)  10^3/uL


 


MPV   (7.4-10.4)  fL


 


Neut % (Auto)   (50.0-70.0)  %


 


Lymph % (Auto)   (20.0-40.0)  %


 


Mono % (Auto)   (2.0-8.0)  %


 


Eos % (Auto)   (1.0-3.0)  %


 


Baso % (Auto)   (0.0-1.0)  %


 


Neut # (Auto)   (2.5-7.0)  10^3/uL


 


Lymph # (Auto)   (1.0-4.0)  10^3/uL


 


Mono # (Auto)   (0.1-0.8)  10^3/uL


 


Eos # (Auto)   (0.1-0.3)  10^3/uL


 


Baso # (Auto)   (0.0-0.1)  10^3/uL


 


Sodium   (136-145)  mmol/L


 


Potassium   (3.3-5.3)  mmol/L


 


Chloride   ()  mmol/L


 


Carbon Dioxide   (21.0-32.0)  mmol/L


 


BUN   (6-25)  mg/dL


 


Creatinine   (0.51-1.17)  mg/dL


 


Est Cr Clr Drug Dosing   mL/min


 


Estimated GFR (MDRD)   mL/min


 


Glucose   ()  mg/dL


 


POC Glucose   ()  mg/dl


 


Hemoglobin A1c  9.7 H  (4.3-5.7)  %


 


Lactic Acid   (0.4-2.0)  mmol/L


 


Calcium   (8.7-10.3)  mg/dL


 


Magnesium   (1.8-2.4)  mg/dL


 


Total Bilirubin   (0.2-1.0)  mg/dL


 


AST   (15-37)  U/L


 


ALT   (12-78)  U/L


 


Alkaline Phosphatase   ()  IU/L


 


C-Reactive Protein   (0.0-0.9)  mg/dL


 


Total Protein   (6.4-8.2)  g/dL


 


Albumin   (3.00-4.80)  g/dL


 


Specimen Type   


 


Urine Color   (YELLOW)  


 


Urine Appearance   (CLEAR)  


 


Urine pH   (5.0-9.0)  


 


Ur Specific Gravity   (1.005-1.030)  


 


Urine Protein   (NEGATIVE)  mg/dL


 


Urine Glucose (UA)   (NEGATIVE)  mg/dL


 


Urine Ketones   (NEGATIVE)  mg/dL


 


Urine Occult Blood   (NEGATIVE)  


 


Urine Nitrite   (NEGATIVE)  


 


Urine Bilirubin   (NEGATIVE)  


 


Urine Urobilinogen   (0.2-1.0)  E.U./dL


 


Ur Leukocyte Esterase   (NEGATIVE)  


 


Urine RBC   /HPF


 


Urine WBC   /HPF


 


Ur Epithelial Cells   /LPF


 


Urine Bacteria   (NONE TO FEW)  /HPF


 


Hyaline Casts   (NEGATIVE)  /LPF


 


Urine Mucus   (NEGATIVE)  /LPF











JERAD Results - Last 24 hrs: 


 Microbiology











 05/05/18 20:00 Clostridium difficile Toxin A & B - Final





 Stool / Feces    NEGATIVE CDIFF TOXIN











Med Orders - Current: 


 Current Medications





Acetaminophen (Tylenol Extra Strength)  1,000 mg PO Q6H PRN


   PRN Reason: Pain


Celecoxib (Celebrex)  200 mg PO DAILY [facility]


   Last Admin: 05/06/18 08:16 Dose:  200 mg


Enoxaparin Sodium (Lovenox)  30 mg SUBCUT Q24H [facility]


   Last Admin: 05/05/18 15:24 Dose:  30 mg


Piperacillin Sod/Tazobactam (Sod 4.5 gm/ Sodium Chloride)  100 mls @ 200 mls/hr 

IV Q8H [facility]


   Last Admin: 05/06/18 05:24 Dose:  200 mls/hr


Sodium Chloride (Normal Saline)  1,000 mls @ 100 mls/hr IV ASDIRECTED [facility]


   Last Admin: 05/06/18 02:36 Dose:  100 mls/hr


Ibuprofen (Motrin)  800 mg PO Q8H PRN


   PRN Reason: Pain


   Last Admin: 05/05/18 21:44 Dose:  800 mg


Insulin Aspart (Novolog)  0 unit SUBCUT WITHMEALSANDBED [facility]; Protocol


   Last Admin: 05/06/18 08:12 Dose:  3 units


Insulin Detemir (Levemir)  32 unit SUBCUT DAILY [facility]


   Last Admin: 05/06/18 08:10 Dose:  32 units


Lactobacillus Acidophilus/Rhamnosus (Multi-Chinyere Plus)  1 cap PO DAILY [facility]


   Last Admin: 05/06/18 08:16 Dose:  1 cap


Lisinopril (Prinivil)  20 mg PO DAILY [facility]


   Last Admin: 05/06/18 08:15 Dose:  20 mg


Magnesium Oxide (Magnesium Oxide)  500 mg PO BID [facility]


   Last Admin: 05/06/18 08:15 Dose:  500 mg


Oxycodone HCl (Oxycodone)  10 mg PO QID PRN


   PRN Reason: SEVERE PAIN


   Last Admin: 05/06/18 08:23 Dose:  10 mg


Trazodone HCl (Trazodone)  50 mg PO BEDTIME [facility]


   Last Admin: 05/05/18 21:41 Dose:  50 mg


Vancomycin HCl (Vancomycin)  0.125 gm PO QID [facility]


   Last Admin: 05/06/18 08:14 Dose:  0.125 gm





Discontinued Medications





Hydromorphone HCl (Dilaudid)  0.5 mg IVPUSH ONETIME ONE


   Stop: 05/05/18 11:24


   Last Admin: 05/05/18 11:34 Dose:  0.5 mg


Sodium Chloride (Normal Saline)  1,000 mls @ 1,000 mls/hr IV .BOLUS STA


   Stop: 05/05/18 12:21


   Last Admin: 05/05/18 11:34 Dose:  1,000 mls/hr


Sodium Chloride (Normal Saline)  1,000 mls @ 999 mls/hr IV .BOLUS ONE


   Stop: 05/05/18 13:24


   Last Admin: 05/05/18 12:35 Dose:  999 mls/hr











- Exam


Quality Assessment: Reports: DVT Prophylaxis (lovenox).  Denies: Supplemental 

Oxygen, Urine Catheter, Skin Breakdown


General: Reports: Alert, Oriented, Cooperative, No Acute Distress


Lungs: Reports: Normal Respiratory Effort, Decreased Breath Sounds (throughout)


Cardiovascular: Reports: Regular Rate, Regular Rhythm, No Murmurs


GI/Abdominal Exam: Normal Bowel Sounds, Soft, Non-Tender, No Distention


Extremities: No Pedal Edema, Leg Pain (left lower leg tenderness with palpation 

of calf area, sofía red in color with dry skin).  No: Redness


Skin: Reports: Warm, Dry, Intact


Neurological: Reports: Normal Speech


Psy/Mental Status: Reports: Alert, Normal Affect, Normal Mood